# Patient Record
Sex: FEMALE | URBAN - METROPOLITAN AREA
[De-identification: names, ages, dates, MRNs, and addresses within clinical notes are randomized per-mention and may not be internally consistent; named-entity substitution may affect disease eponyms.]

---

## 2022-10-20 ENCOUNTER — OFFICE VISIT (OUTPATIENT)
Dept: URBAN - METROPOLITAN AREA CLINIC 31 | Facility: CLINIC | Age: 82
End: 2022-10-20
Payer: OTHER MISCELLANEOUS

## 2022-10-20 DIAGNOSIS — Z96.1 PRESENCE OF INTRAOCULAR LENS: ICD-10-CM

## 2022-10-20 DIAGNOSIS — H35.3221 EXDTVE AGE-REL MCLR DEGN, LEFT EYE, WITH ACTV CHRDL NEOVAS: Primary | ICD-10-CM

## 2022-10-20 DIAGNOSIS — H35.3112 NEXDTVE AGE-RELATED MCLR DEGN, RIGHT EYE, INTERMED DRY STAGE: ICD-10-CM

## 2022-10-20 PROCEDURE — 92134 CPTRZ OPH DX IMG PST SGM RTA: CPT | Performed by: OPHTHALMOLOGY

## 2022-10-20 PROCEDURE — 99204 OFFICE O/P NEW MOD 45 MIN: CPT | Performed by: OPHTHALMOLOGY

## 2022-10-20 NOTE — IMPRESSION/PLAN
Impression: Exdtve age-rel mclr degn, left eye, with actv chrdl neovas: A49.1178.
h/o antiVEGF with outside retina doctor Plan: Exam and OCT demonstrate IRF. 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 The diagnosis, natural history, and prognosis of wet AMD were discussed. The R/B/As of various treatment options including observation, laser (PDT), and intravitreal Anti-VEGF injections were discussed. Participation in a clinical trial was also discussed. The patient understands that treatment may not improve vision, but should reduce the risk of further visual loss. The patient also understands the likely need for chronic treatment and the risk of vision loss without treatment. Recommend intravitreal Avastin injection today. R/B/A discussed and patient elects to proceed. Will schedule due to insurance authorization. 

RTC 1 wk with OCT OU, Optos FA OS>OD, straight Avastin OS #1/1

## 2022-10-20 NOTE — IMPRESSION/PLAN
Impression: Nexdtve age-related mclr degn, right eye, intermed dry stage: H35.3112. Plan: Exam and OCT demonstrate stable drusen and RPE changes and mild atrophy. The patient was advised to continue AREDS 2 vitamin supplements; the risk of smoking was emphasized with the patient. The patient was also advised to continue to use an [address] to monitor the vision and to call immediately with any changes.

## 2022-11-04 ENCOUNTER — PROCEDURE (OUTPATIENT)
Dept: URBAN - METROPOLITAN AREA CLINIC 41 | Facility: CLINIC | Age: 82
End: 2022-11-04
Payer: OTHER MISCELLANEOUS

## 2022-11-04 DIAGNOSIS — H35.3221 EXUDATIVE AGE-RELATED MACULAR DEGENERATION, LEFT EYE, WITH ACTIVE CHOROIDAL NEOVASCULARIZATION: Primary | ICD-10-CM

## 2022-11-04 PROCEDURE — 92134 CPTRZ OPH DX IMG PST SGM RTA: CPT | Performed by: OPHTHALMOLOGY

## 2022-11-04 PROCEDURE — 67028 INJECTION EYE DRUG: CPT | Performed by: OPHTHALMOLOGY

## 2022-11-04 PROCEDURE — 92235 FLUORESCEIN ANGRPH MLTIFRAME: CPT | Performed by: OPHTHALMOLOGY

## 2022-11-04 ASSESSMENT — INTRAOCULAR PRESSURE
OD: 14
OS: 14

## 2023-01-27 ENCOUNTER — OFFICE VISIT (OUTPATIENT)
Dept: URBAN - METROPOLITAN AREA CLINIC 41 | Facility: CLINIC | Age: 83
End: 2023-01-27
Payer: OTHER MISCELLANEOUS

## 2023-01-27 DIAGNOSIS — H43.22 CRYSTALLINE DEPOSIT IN VITREOUS BODY OF LEFT EYE: ICD-10-CM

## 2023-01-27 DIAGNOSIS — Z96.1 PRESENCE OF INTRAOCULAR LENS: ICD-10-CM

## 2023-01-27 DIAGNOSIS — H35.3221 EXDTVE AGE-REL MCLR DEGN, LEFT EYE, WITH ACTV CHRDL NEOVAS: Primary | ICD-10-CM

## 2023-01-27 DIAGNOSIS — H35.3112 NEXDTVE AGE-RELATED MCLR DEGN, RIGHT EYE, INTERMED DRY STAGE: ICD-10-CM

## 2023-01-27 PROCEDURE — 92134 CPTRZ OPH DX IMG PST SGM RTA: CPT | Performed by: OPHTHALMOLOGY

## 2023-01-27 PROCEDURE — 67028 INJECTION EYE DRUG: CPT | Performed by: OPHTHALMOLOGY

## 2023-01-27 PROCEDURE — 92012 INTRM OPH EXAM EST PATIENT: CPT | Performed by: OPHTHALMOLOGY

## 2023-01-27 ASSESSMENT — INTRAOCULAR PRESSURE
OS: 17
OD: 15

## 2023-01-27 NOTE — IMPRESSION/PLAN
Impression: Nexdtve age-related mclr degn, right eye, intermed dry stage: H35.3112. Plan: Exam and OCT demonstrate stable drusen and RPE changes and mild atrophy. The patient was advised to continue AREDS 2 vitamin supplements; the risk of smoking was emphasized with the patient. The patient was also advised to continue to use an [address] to monitor the vision and to call immediately with any changes.

## 2023-01-27 NOTE — IMPRESSION/PLAN
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 We discussed that while this condition usually does not affect vision, it can lead to debilitating floaters in some patients. Discussed if vision declines, I would recommend a fluorescein angiogram which can visualize the retina through the asteroid bodies. R/B/A of PPV vs observation were discussed and recommend observation for now as the patient denies any significant disability due to the asteroid hyalosis. RDW discussed. Recommend observation.

## 2023-01-27 NOTE — IMPRESSION/PLAN
Impression: Exdtve age-rel mclr degn, left eye, with actv chrdl neovas: T83.7844.
h/o antiVEGF with outside retina doctor
-s/p Avastin 11/04/2022 - Optos fa 11/4/2022: leakage OS; staining of GA OU  Plan: Exam and OCT demonstrate improved SRF and IRF after initiation of antiVEGF therapy; she had done well at 3 mo since last tx. Recommend intravitreal Avastin injection today and follow up in 14 wks. R/B/A discussed and patient elects to proceed. Intravitreal Avastin injection was performed in the left eye in clinic without complication.   

RTC 14 weeks re-eval with OCT OU, possible Avastin

## 2023-05-05 ENCOUNTER — OFFICE VISIT (OUTPATIENT)
Dept: URBAN - METROPOLITAN AREA CLINIC 41 | Facility: CLINIC | Age: 83
End: 2023-05-05
Payer: OTHER MISCELLANEOUS

## 2023-05-05 DIAGNOSIS — H43.22 CRYSTALLINE DEPOSIT IN VITREOUS BODY OF LEFT EYE: ICD-10-CM

## 2023-05-05 DIAGNOSIS — Z96.1 PRESENCE OF INTRAOCULAR LENS: ICD-10-CM

## 2023-05-05 DIAGNOSIS — H35.3221 EXUDATIVE AGE-RELATED MACULAR DEGENERATION, LEFT EYE, WITH ACTIVE CHOROIDAL NEOVASCULARIZATION: ICD-10-CM

## 2023-05-05 DIAGNOSIS — H35.3231 EXUDATIVE AGE-REL MCLR DEGN, BI, WITH ACTV CHRDL NEOVAS: Primary | ICD-10-CM

## 2023-05-05 PROCEDURE — 92012 INTRM OPH EXAM EST PATIENT: CPT | Performed by: OPHTHALMOLOGY

## 2023-05-05 PROCEDURE — 92134 CPTRZ OPH DX IMG PST SGM RTA: CPT | Performed by: OPHTHALMOLOGY

## 2023-05-05 ASSESSMENT — INTRAOCULAR PRESSURE
OD: 15
OS: 15

## 2023-05-05 NOTE — IMPRESSION/PLAN
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 OD: naive 5/5/2023 OS: s/p Avastin, h/o antiVEGF injection Plan: OD:  Exam and OCT demonstrate new SRF and IRF. The findings are consistent with conversion to wet AMD.  The diagnosis, natural history, and prognosis of wet AMD were discussed. The R/B/As of various treatment options including observation, laser (PDT), and intravitreal Anti-VEGF injections were discussed. Participation in a clinical trial was also discussed. The patient understands that treatment may not improve vision, but should reduce the risk of further visual loss. The patient also understands the likely need for chronic treatment and the risk of vision loss without treatment. Recommend intravitreal Avastin injection today. R/B/A discussed and patient elects to proceed. OS: Patient's vision is significantly worse. Exam and OCT demonstrate SRF/IRF on Avastin. Rec Avastin today and switch to [facility] given decline in vision and continued activity on Avastin. Intravitreal Avastin injection was performed in both eyes in clinic without complication.   

RTC 4-6 weeks re-eval with OCT OU, possible Eylea OU

## 2023-06-05 ENCOUNTER — OFFICE VISIT (OUTPATIENT)
Dept: URBAN - METROPOLITAN AREA CLINIC 41 | Facility: CLINIC | Age: 83
End: 2023-06-05
Payer: OTHER MISCELLANEOUS

## 2023-06-05 DIAGNOSIS — H35.3231 EXUDATIVE AGE-REL MCLR DEGN, BI, WITH ACTV CHRDL NEOVAS: Primary | ICD-10-CM

## 2023-06-05 DIAGNOSIS — H43.22 CRYSTALLINE DEPOSIT IN VITREOUS BODY OF LEFT EYE: ICD-10-CM

## 2023-06-05 DIAGNOSIS — Z96.1 PRESENCE OF INTRAOCULAR LENS: ICD-10-CM

## 2023-06-05 PROCEDURE — 92134 CPTRZ OPH DX IMG PST SGM RTA: CPT | Performed by: OPHTHALMOLOGY

## 2023-06-05 PROCEDURE — 99214 OFFICE O/P EST MOD 30 MIN: CPT | Performed by: OPHTHALMOLOGY

## 2023-06-05 ASSESSMENT — INTRAOCULAR PRESSURE
OD: 17
OS: 19

## 2023-06-05 NOTE — IMPRESSION/PLAN
Impression: Exudative age-rel mclr degn, bi, with actv chrdl neovas: H35.3231. OD: naive 5/5/2023 OS: s/p Avastin, h/o antiVEGF injection
-s/p Avastin OU 05/05/2023 Plan: OD:  Exam and OCT demonstrate improving but persistent SRF and IRF on Avastin. Vision is remaining blurry. Recommend intravitreal Avastin injection today. May require switch to [facility] at next visit. R/B/A discussed and patient elects to proceed. OS: Exam and OCT demonstrate worse IRF on Avastin. Rec Avastin today and switch to [facility] given blurry vision and continued activity on Avastin. Intravitreal Avastin injection was performed in both eyes in clinic without complication.   

RTC 4-5 weeks OCT OU, possible Eylea OU

## 2023-07-14 ENCOUNTER — OFFICE VISIT (OUTPATIENT)
Dept: URBAN - METROPOLITAN AREA CLINIC 41 | Facility: CLINIC | Age: 83
End: 2023-07-14
Payer: OTHER MISCELLANEOUS

## 2023-07-14 DIAGNOSIS — Z96.1 PRESENCE OF INTRAOCULAR LENS: ICD-10-CM

## 2023-07-14 DIAGNOSIS — H35.3231 EXUDATIVE AGE-REL MCLR DEGN, BI, WITH ACTV CHRDL NEOVAS: Primary | ICD-10-CM

## 2023-07-14 DIAGNOSIS — H43.22 CRYSTALLINE DEPOSIT IN VITREOUS BODY OF LEFT EYE: ICD-10-CM

## 2023-07-14 PROCEDURE — 92134 CPTRZ OPH DX IMG PST SGM RTA: CPT | Performed by: OPHTHALMOLOGY

## 2023-07-14 PROCEDURE — 92012 INTRM OPH EXAM EST PATIENT: CPT | Performed by: OPHTHALMOLOGY

## 2023-07-14 ASSESSMENT — INTRAOCULAR PRESSURE
OD: 17
OS: 17

## 2023-07-14 NOTE — IMPRESSION/PLAN
Impression: Exudative age-rel mclr degn, bi, with actv chrdl neovas: H35.3231. OD: naive 5/5/2023 OS: s/p Avastin, h/o antiVEGF injection
-s/p Avastin OU 06/05/2023 Plan: OD:  Exam and OCT demonstrate improving but persistent SRF and IRF on Avastin. Vision is remaining blurry. Recommend intravitreal Avastin injection today. May require switch to [facility] at next visit. R/B/A discussed and patient elects to proceed. OS: Exam and OCT demonstrate worse IRF on Avastin. Rec Avastin today and switch to [facility] given blurry vision and continued activity on Avastin. Intravitreal Avastin injection was performed in both eyes in clinic without complication.   

RTC 4-5 weeks OCT OU, possible Eylea OU

## 2023-08-14 ENCOUNTER — OFFICE VISIT (OUTPATIENT)
Dept: URBAN - METROPOLITAN AREA CLINIC 41 | Facility: CLINIC | Age: 83
End: 2023-08-14
Payer: OTHER MISCELLANEOUS

## 2023-08-14 DIAGNOSIS — H04.123 DRY EYE SYNDROME OF BILATERAL LACRIMAL GLANDS: ICD-10-CM

## 2023-08-14 DIAGNOSIS — H35.3231 EXUDATIVE AGE-REL MCLR DEGN, BI, WITH ACTV CHRDL NEOVAS: Primary | ICD-10-CM

## 2023-08-14 DIAGNOSIS — H43.22 CRYSTALLINE DEPOSIT IN VITREOUS BODY OF LEFT EYE: ICD-10-CM

## 2023-08-14 DIAGNOSIS — Z96.1 PRESENCE OF INTRAOCULAR LENS: ICD-10-CM

## 2023-08-14 PROCEDURE — 92012 INTRM OPH EXAM EST PATIENT: CPT | Performed by: OPHTHALMOLOGY

## 2023-08-14 PROCEDURE — 92134 CPTRZ OPH DX IMG PST SGM RTA: CPT | Performed by: OPHTHALMOLOGY

## 2023-08-14 ASSESSMENT — INTRAOCULAR PRESSURE
OS: 14
OD: 14

## 2023-09-11 ENCOUNTER — OFFICE VISIT (OUTPATIENT)
Dept: URBAN - METROPOLITAN AREA CLINIC 41 | Facility: CLINIC | Age: 83
End: 2023-09-11
Payer: OTHER MISCELLANEOUS

## 2023-09-11 DIAGNOSIS — H04.123 DRY EYE SYNDROME OF BILATERAL LACRIMAL GLANDS: ICD-10-CM

## 2023-09-11 DIAGNOSIS — H35.3231 EXUDATIVE AGE-RELATED MACULAR DEGENERATION, BILATERAL, WITH ACTIVE CHOROIDAL NEOVASCULARIZATION: Primary | ICD-10-CM

## 2023-09-11 DIAGNOSIS — Z96.1 PRESENCE OF INTRAOCULAR LENS: ICD-10-CM

## 2023-09-11 DIAGNOSIS — H43.22 CRYSTALLINE DEPOSIT IN VITREOUS BODY OF LEFT EYE: ICD-10-CM

## 2023-09-11 PROCEDURE — 92134 CPTRZ OPH DX IMG PST SGM RTA: CPT | Performed by: OPHTHALMOLOGY

## 2023-09-11 PROCEDURE — 92014 COMPRE OPH EXAM EST PT 1/>: CPT | Performed by: OPHTHALMOLOGY

## 2023-09-11 ASSESSMENT — INTRAOCULAR PRESSURE
OD: 14
OS: 15

## 2023-10-23 ENCOUNTER — OFFICE VISIT (OUTPATIENT)
Dept: URBAN - METROPOLITAN AREA CLINIC 41 | Facility: CLINIC | Age: 83
End: 2023-10-23
Payer: OTHER MISCELLANEOUS

## 2023-10-23 DIAGNOSIS — H35.3231 EXUDATIVE AGE-RELATED MACULAR DEGENERATION, BILATERAL, WITH ACTIVE CHOROIDAL NEOVASCULARIZATION: Primary | ICD-10-CM

## 2023-10-23 PROCEDURE — 92134 CPTRZ OPH DX IMG PST SGM RTA: CPT | Performed by: OPHTHALMOLOGY

## 2023-10-23 ASSESSMENT — INTRAOCULAR PRESSURE
OD: 17
OS: 16

## 2023-12-04 ENCOUNTER — OFFICE VISIT (OUTPATIENT)
Dept: URBAN - METROPOLITAN AREA CLINIC 41 | Facility: CLINIC | Age: 83
End: 2023-12-04
Payer: OTHER MISCELLANEOUS

## 2023-12-04 PROCEDURE — 92134 CPTRZ OPH DX IMG PST SGM RTA: CPT | Performed by: OPHTHALMOLOGY

## 2023-12-04 ASSESSMENT — INTRAOCULAR PRESSURE
OS: 12
OD: 13

## 2024-01-15 ENCOUNTER — OFFICE VISIT (OUTPATIENT)
Dept: URBAN - METROPOLITAN AREA CLINIC 41 | Facility: CLINIC | Age: 84
End: 2024-01-15
Payer: MEDICARE

## 2024-01-15 DIAGNOSIS — H35.3231 EXUDATIVE AGE-RELATED MACULAR DEGENERATION, BILATERAL, WITH ACTIVE CHOROIDAL NEOVASCULARIZATION: Primary | ICD-10-CM

## 2024-01-15 PROCEDURE — 92134 CPTRZ OPH DX IMG PST SGM RTA: CPT | Performed by: OPHTHALMOLOGY

## 2024-01-15 ASSESSMENT — INTRAOCULAR PRESSURE
OD: 13
OS: 13

## 2024-03-29 ENCOUNTER — OFFICE VISIT (OUTPATIENT)
Dept: URBAN - METROPOLITAN AREA CLINIC 41 | Facility: CLINIC | Age: 84
End: 2024-03-29
Payer: MEDICARE

## 2024-03-29 DIAGNOSIS — H43.22 CRYSTALLINE DEPOSIT IN VITREOUS BODY OF LEFT EYE: ICD-10-CM

## 2024-03-29 DIAGNOSIS — H35.3231 EXUDATIVE AGE-REL MCLR DEGN, BI, WITH ACTV CHRDL NEOVAS: Primary | ICD-10-CM

## 2024-03-29 DIAGNOSIS — H04.123 DRY EYE SYNDROME OF BILATERAL LACRIMAL GLANDS: ICD-10-CM

## 2024-03-29 DIAGNOSIS — Z96.1 PRESENCE OF INTRAOCULAR LENS: ICD-10-CM

## 2024-03-29 PROCEDURE — 92134 CPTRZ OPH DX IMG PST SGM RTA: CPT | Performed by: OPHTHALMOLOGY

## 2024-03-29 PROCEDURE — 99214 OFFICE O/P EST MOD 30 MIN: CPT | Performed by: OPHTHALMOLOGY

## 2024-03-29 ASSESSMENT — INTRAOCULAR PRESSURE
OD: 15
OS: 16

## 2024-06-07 ENCOUNTER — OFFICE VISIT (OUTPATIENT)
Dept: URBAN - METROPOLITAN AREA CLINIC 41 | Facility: CLINIC | Age: 84
End: 2024-06-07
Payer: MEDICARE

## 2024-06-07 DIAGNOSIS — H35.3231 EXUDATIVE AGE-RELATED MACULAR DEGENERATION, BILATERAL, WITH ACTIVE CHOROIDAL NEOVASCULARIZATION: Primary | ICD-10-CM

## 2024-06-07 PROCEDURE — 92134 CPTRZ OPH DX IMG PST SGM RTA: CPT | Performed by: OPHTHALMOLOGY

## 2024-06-07 ASSESSMENT — INTRAOCULAR PRESSURE
OD: 20
OS: 20

## 2024-08-16 ENCOUNTER — OFFICE VISIT (OUTPATIENT)
Dept: URBAN - METROPOLITAN AREA CLINIC 41 | Facility: CLINIC | Age: 84
End: 2024-08-16
Payer: MEDICARE

## 2024-08-16 DIAGNOSIS — H35.3231 EXUDATIVE AGE-RELATED MACULAR DEGENERATION, BILATERAL, WITH ACTIVE CHOROIDAL NEOVASCULARIZATION: Primary | ICD-10-CM

## 2024-08-16 PROCEDURE — 92134 CPTRZ OPH DX IMG PST SGM RTA: CPT | Performed by: OPHTHALMOLOGY

## 2024-08-16 ASSESSMENT — INTRAOCULAR PRESSURE
OS: 13
OD: 17

## 2024-10-25 ENCOUNTER — OFFICE VISIT (OUTPATIENT)
Dept: URBAN - METROPOLITAN AREA CLINIC 41 | Facility: CLINIC | Age: 84
End: 2024-10-25
Payer: MEDICARE

## 2024-10-25 DIAGNOSIS — H35.3231 EXUDATIVE AGE-RELATED MACULAR DEGENERATION, BILATERAL, WITH ACTIVE CHOROIDAL NEOVASCULARIZATION: Primary | ICD-10-CM

## 2024-10-25 PROCEDURE — 92134 CPTRZ OPH DX IMG PST SGM RTA: CPT | Performed by: OPHTHALMOLOGY

## 2024-10-25 ASSESSMENT — INTRAOCULAR PRESSURE
OS: 15
OD: 17

## 2025-02-17 ENCOUNTER — OFFICE VISIT (OUTPATIENT)
Facility: LOCATION | Age: 85
End: 2025-02-17
Payer: MEDICARE

## 2025-02-17 DIAGNOSIS — H43.22 CRYSTALLINE DEPOSIT IN VITREOUS BODY OF LEFT EYE: ICD-10-CM

## 2025-02-17 DIAGNOSIS — Z96.1 PRESENCE OF INTRAOCULAR LENS: ICD-10-CM

## 2025-02-17 DIAGNOSIS — H35.3231 EXUDATIVE AGE-RELATED MACULAR DEGENERATION, BILATERAL, WITH ACTIVE CHOROIDAL NEOVASCULARIZATION: Primary | ICD-10-CM

## 2025-02-17 DIAGNOSIS — H04.123 DRY EYE SYNDROME OF BILATERAL LACRIMAL GLANDS: ICD-10-CM

## 2025-02-17 PROCEDURE — 92134 CPTRZ OPH DX IMG PST SGM RTA: CPT | Performed by: OPHTHALMOLOGY

## 2025-02-17 PROCEDURE — 92014 COMPRE OPH EXAM EST PT 1/>: CPT | Performed by: OPHTHALMOLOGY

## 2025-02-17 ASSESSMENT — INTRAOCULAR PRESSURE
OD: 12
OS: 14

## 2025-05-09 ENCOUNTER — OFFICE VISIT (OUTPATIENT)
Dept: URBAN - METROPOLITAN AREA CLINIC 41 | Facility: CLINIC | Age: 85
End: 2025-05-09
Payer: MEDICARE

## 2025-05-09 DIAGNOSIS — H35.3231 EXUDATIVE AGE-RELATED MACULAR DEGENERATION, BILATERAL, WITH ACTIVE CHOROIDAL NEOVASCULARIZATION: Primary | ICD-10-CM

## 2025-05-09 PROCEDURE — 92134 CPTRZ OPH DX IMG PST SGM RTA: CPT | Performed by: OPHTHALMOLOGY

## 2025-05-09 ASSESSMENT — INTRAOCULAR PRESSURE
OS: 15
OD: 17

## 2025-08-13 ENCOUNTER — OFFICE VISIT (OUTPATIENT)
Dept: URBAN - METROPOLITAN AREA CLINIC 41 | Facility: CLINIC | Age: 85
End: 2025-08-13
Payer: MEDICARE

## 2025-08-13 DIAGNOSIS — H35.3231 EXUDATIVE AGE-RELATED MACULAR DEGENERATION, BILATERAL, WITH ACTIVE CHOROIDAL NEOVASCULARIZATION: Primary | ICD-10-CM

## 2025-08-13 PROCEDURE — 92134 CPTRZ OPH DX IMG PST SGM RTA: CPT | Performed by: OPHTHALMOLOGY

## 2025-08-13 ASSESSMENT — INTRAOCULAR PRESSURE
OD: 17
OS: 16